# Patient Record
Sex: MALE | Race: WHITE | NOT HISPANIC OR LATINO | Employment: FULL TIME | URBAN - METROPOLITAN AREA
[De-identification: names, ages, dates, MRNs, and addresses within clinical notes are randomized per-mention and may not be internally consistent; named-entity substitution may affect disease eponyms.]

---

## 2023-06-23 NOTE — PROGRESS NOTES
Health Maintenance Due   Topic     Hepatitis C Screening  Consult pcp    HIV Screening  Consult pcp    TETANUS VACCINE  Pt agree to get today    Colorectal Cancer Screening  Information received.      COVID-19 Vaccine (4 - Pfizer series) Not offered at this Facility

## 2023-06-26 ENCOUNTER — LAB VISIT (OUTPATIENT)
Dept: LAB | Facility: HOSPITAL | Age: 48
End: 2023-06-26
Attending: STUDENT IN AN ORGANIZED HEALTH CARE EDUCATION/TRAINING PROGRAM
Payer: COMMERCIAL

## 2023-06-26 ENCOUNTER — OFFICE VISIT (OUTPATIENT)
Dept: FAMILY MEDICINE | Facility: CLINIC | Age: 48
End: 2023-06-26
Payer: COMMERCIAL

## 2023-06-26 VITALS
HEIGHT: 68 IN | RESPIRATION RATE: 18 BRPM | OXYGEN SATURATION: 96 % | SYSTOLIC BLOOD PRESSURE: 121 MMHG | BODY MASS INDEX: 40.23 KG/M2 | TEMPERATURE: 98 F | WEIGHT: 265.44 LBS | HEART RATE: 66 BPM | DIASTOLIC BLOOD PRESSURE: 82 MMHG

## 2023-06-26 DIAGNOSIS — Z23 NEED FOR TDAP VACCINATION: ICD-10-CM

## 2023-06-26 DIAGNOSIS — I10 PRIMARY HYPERTENSION: Primary | ICD-10-CM

## 2023-06-26 DIAGNOSIS — Z11.59 ENCOUNTER FOR HCV SCREENING TEST FOR LOW RISK PATIENT: ICD-10-CM

## 2023-06-26 DIAGNOSIS — Z11.4 ENCOUNTER FOR SCREENING FOR HIV: ICD-10-CM

## 2023-06-26 DIAGNOSIS — R22.1 LUMP IN NECK: ICD-10-CM

## 2023-06-26 DIAGNOSIS — Z00.00 ANNUAL PHYSICAL EXAM: ICD-10-CM

## 2023-06-26 DIAGNOSIS — R06.83 SNORING: ICD-10-CM

## 2023-06-26 DIAGNOSIS — Z12.11 ENCOUNTER FOR COLORECTAL CANCER SCREENING: ICD-10-CM

## 2023-06-26 DIAGNOSIS — Z12.12 ENCOUNTER FOR COLORECTAL CANCER SCREENING: ICD-10-CM

## 2023-06-26 DIAGNOSIS — E78.5 HYPERLIPIDEMIA, UNSPECIFIED HYPERLIPIDEMIA TYPE: ICD-10-CM

## 2023-06-26 PROBLEM — E78.49 OTHER HYPERLIPIDEMIA: Status: ACTIVE | Noted: 2022-12-23

## 2023-06-26 LAB
ALBUMIN SERPL BCP-MCNC: 4.2 G/DL (ref 3.5–5.2)
ALP SERPL-CCNC: 51 U/L (ref 55–135)
ALT SERPL W/O P-5'-P-CCNC: 34 U/L (ref 10–44)
ANION GAP SERPL CALC-SCNC: 12 MMOL/L (ref 8–16)
AST SERPL-CCNC: 27 U/L (ref 10–40)
BASOPHILS # BLD AUTO: 0.05 K/UL (ref 0–0.2)
BASOPHILS NFR BLD: 0.5 % (ref 0–1.9)
BILIRUB SERPL-MCNC: 1.1 MG/DL (ref 0.1–1)
BUN SERPL-MCNC: 15 MG/DL (ref 6–20)
CALCIUM SERPL-MCNC: 9.5 MG/DL (ref 8.7–10.5)
CHLORIDE SERPL-SCNC: 99 MMOL/L (ref 95–110)
CHOLEST SERPL-MCNC: 162 MG/DL (ref 120–199)
CHOLEST/HDLC SERPL: 3.8 {RATIO} (ref 2–5)
CO2 SERPL-SCNC: 26 MMOL/L (ref 23–29)
CREAT SERPL-MCNC: 0.9 MG/DL (ref 0.5–1.4)
DIFFERENTIAL METHOD: ABNORMAL
EOSINOPHIL # BLD AUTO: 0.2 K/UL (ref 0–0.5)
EOSINOPHIL NFR BLD: 1.8 % (ref 0–8)
ERYTHROCYTE [DISTWIDTH] IN BLOOD BY AUTOMATED COUNT: 14.2 % (ref 11.5–14.5)
EST. GFR  (NO RACE VARIABLE): >60 ML/MIN/1.73 M^2
ESTIMATED AVG GLUCOSE: 111 MG/DL (ref 68–131)
GLUCOSE SERPL-MCNC: 106 MG/DL (ref 70–110)
HBA1C MFR BLD: 5.5 % (ref 4–5.6)
HCT VFR BLD AUTO: 49.1 % (ref 40–54)
HCV AB SERPL QL IA: NORMAL
HDLC SERPL-MCNC: 43 MG/DL (ref 40–75)
HDLC SERPL: 26.5 % (ref 20–50)
HGB BLD-MCNC: 16.1 G/DL (ref 14–18)
HIV 1+2 AB+HIV1 P24 AG SERPL QL IA: NORMAL
IMM GRANULOCYTES # BLD AUTO: 0.04 K/UL (ref 0–0.04)
IMM GRANULOCYTES NFR BLD AUTO: 0.4 % (ref 0–0.5)
LDLC SERPL CALC-MCNC: 99 MG/DL (ref 63–159)
LYMPHOCYTES # BLD AUTO: 2 K/UL (ref 1–4.8)
LYMPHOCYTES NFR BLD: 19.4 % (ref 18–48)
MCH RBC QN AUTO: 29.4 PG (ref 27–31)
MCHC RBC AUTO-ENTMCNC: 32.8 G/DL (ref 32–36)
MCV RBC AUTO: 90 FL (ref 82–98)
MONOCYTES # BLD AUTO: 1.2 K/UL (ref 0.3–1)
MONOCYTES NFR BLD: 11.4 % (ref 4–15)
NEUTROPHILS # BLD AUTO: 7 K/UL (ref 1.8–7.7)
NEUTROPHILS NFR BLD: 66.5 % (ref 38–73)
NONHDLC SERPL-MCNC: 119 MG/DL
NRBC BLD-RTO: 0 /100 WBC
PLATELET # BLD AUTO: 265 K/UL (ref 150–450)
PMV BLD AUTO: 10.6 FL (ref 9.2–12.9)
POTASSIUM SERPL-SCNC: 4.1 MMOL/L (ref 3.5–5.1)
PROT SERPL-MCNC: 7.3 G/DL (ref 6–8.4)
RBC # BLD AUTO: 5.48 M/UL (ref 4.6–6.2)
SODIUM SERPL-SCNC: 137 MMOL/L (ref 136–145)
TRIGL SERPL-MCNC: 100 MG/DL (ref 30–150)
TSH SERPL DL<=0.005 MIU/L-ACNC: 1.96 UIU/ML (ref 0.4–4)
WBC # BLD AUTO: 10.46 K/UL (ref 3.9–12.7)

## 2023-06-26 PROCEDURE — 90471 IMMUNIZATION ADMIN: CPT | Mod: S$GLB,,, | Performed by: STUDENT IN AN ORGANIZED HEALTH CARE EDUCATION/TRAINING PROGRAM

## 2023-06-26 PROCEDURE — 84443 ASSAY THYROID STIM HORMONE: CPT | Performed by: STUDENT IN AN ORGANIZED HEALTH CARE EDUCATION/TRAINING PROGRAM

## 2023-06-26 PROCEDURE — 99999 PR PBB SHADOW E&M-EST. PATIENT-LVL V: ICD-10-PCS | Mod: PBBFAC,,, | Performed by: STUDENT IN AN ORGANIZED HEALTH CARE EDUCATION/TRAINING PROGRAM

## 2023-06-26 PROCEDURE — 3074F PR MOST RECENT SYSTOLIC BLOOD PRESSURE < 130 MM HG: ICD-10-PCS | Mod: CPTII,S$GLB,, | Performed by: STUDENT IN AN ORGANIZED HEALTH CARE EDUCATION/TRAINING PROGRAM

## 2023-06-26 PROCEDURE — 3079F DIAST BP 80-89 MM HG: CPT | Mod: CPTII,S$GLB,, | Performed by: STUDENT IN AN ORGANIZED HEALTH CARE EDUCATION/TRAINING PROGRAM

## 2023-06-26 PROCEDURE — 99999 PR PBB SHADOW E&M-EST. PATIENT-LVL V: CPT | Mod: PBBFAC,,, | Performed by: STUDENT IN AN ORGANIZED HEALTH CARE EDUCATION/TRAINING PROGRAM

## 2023-06-26 PROCEDURE — 3079F PR MOST RECENT DIASTOLIC BLOOD PRESSURE 80-89 MM HG: ICD-10-PCS | Mod: CPTII,S$GLB,, | Performed by: STUDENT IN AN ORGANIZED HEALTH CARE EDUCATION/TRAINING PROGRAM

## 2023-06-26 PROCEDURE — 1159F MED LIST DOCD IN RCRD: CPT | Mod: CPTII,S$GLB,, | Performed by: STUDENT IN AN ORGANIZED HEALTH CARE EDUCATION/TRAINING PROGRAM

## 2023-06-26 PROCEDURE — 83036 HEMOGLOBIN GLYCOSYLATED A1C: CPT | Performed by: STUDENT IN AN ORGANIZED HEALTH CARE EDUCATION/TRAINING PROGRAM

## 2023-06-26 PROCEDURE — 3074F SYST BP LT 130 MM HG: CPT | Mod: CPTII,S$GLB,, | Performed by: STUDENT IN AN ORGANIZED HEALTH CARE EDUCATION/TRAINING PROGRAM

## 2023-06-26 PROCEDURE — 1160F RVW MEDS BY RX/DR IN RCRD: CPT | Mod: CPTII,S$GLB,, | Performed by: STUDENT IN AN ORGANIZED HEALTH CARE EDUCATION/TRAINING PROGRAM

## 2023-06-26 PROCEDURE — 87389 HIV-1 AG W/HIV-1&-2 AB AG IA: CPT | Performed by: STUDENT IN AN ORGANIZED HEALTH CARE EDUCATION/TRAINING PROGRAM

## 2023-06-26 PROCEDURE — 90715 TDAP VACCINE 7 YRS/> IM: CPT | Mod: S$GLB,,, | Performed by: STUDENT IN AN ORGANIZED HEALTH CARE EDUCATION/TRAINING PROGRAM

## 2023-06-26 PROCEDURE — 86803 HEPATITIS C AB TEST: CPT | Performed by: STUDENT IN AN ORGANIZED HEALTH CARE EDUCATION/TRAINING PROGRAM

## 2023-06-26 PROCEDURE — 3008F BODY MASS INDEX DOCD: CPT | Mod: CPTII,S$GLB,, | Performed by: STUDENT IN AN ORGANIZED HEALTH CARE EDUCATION/TRAINING PROGRAM

## 2023-06-26 PROCEDURE — 36415 COLL VENOUS BLD VENIPUNCTURE: CPT | Mod: PO | Performed by: STUDENT IN AN ORGANIZED HEALTH CARE EDUCATION/TRAINING PROGRAM

## 2023-06-26 PROCEDURE — 3008F PR BODY MASS INDEX (BMI) DOCUMENTED: ICD-10-PCS | Mod: CPTII,S$GLB,, | Performed by: STUDENT IN AN ORGANIZED HEALTH CARE EDUCATION/TRAINING PROGRAM

## 2023-06-26 PROCEDURE — 99204 OFFICE O/P NEW MOD 45 MIN: CPT | Mod: 25,S$GLB,, | Performed by: STUDENT IN AN ORGANIZED HEALTH CARE EDUCATION/TRAINING PROGRAM

## 2023-06-26 PROCEDURE — 90715 TDAP VACCINE GREATER THAN OR EQUAL TO 7YO IM: ICD-10-PCS | Mod: S$GLB,,, | Performed by: STUDENT IN AN ORGANIZED HEALTH CARE EDUCATION/TRAINING PROGRAM

## 2023-06-26 PROCEDURE — 80061 LIPID PANEL: CPT | Performed by: STUDENT IN AN ORGANIZED HEALTH CARE EDUCATION/TRAINING PROGRAM

## 2023-06-26 PROCEDURE — 80053 COMPREHEN METABOLIC PANEL: CPT | Performed by: STUDENT IN AN ORGANIZED HEALTH CARE EDUCATION/TRAINING PROGRAM

## 2023-06-26 PROCEDURE — 1159F PR MEDICATION LIST DOCUMENTED IN MEDICAL RECORD: ICD-10-PCS | Mod: CPTII,S$GLB,, | Performed by: STUDENT IN AN ORGANIZED HEALTH CARE EDUCATION/TRAINING PROGRAM

## 2023-06-26 PROCEDURE — 99204 PR OFFICE/OUTPT VISIT, NEW, LEVL IV, 45-59 MIN: ICD-10-PCS | Mod: 25,S$GLB,, | Performed by: STUDENT IN AN ORGANIZED HEALTH CARE EDUCATION/TRAINING PROGRAM

## 2023-06-26 PROCEDURE — 85025 COMPLETE CBC W/AUTO DIFF WBC: CPT | Performed by: STUDENT IN AN ORGANIZED HEALTH CARE EDUCATION/TRAINING PROGRAM

## 2023-06-26 PROCEDURE — 90471 TDAP VACCINE GREATER THAN OR EQUAL TO 7YO IM: ICD-10-PCS | Mod: S$GLB,,, | Performed by: STUDENT IN AN ORGANIZED HEALTH CARE EDUCATION/TRAINING PROGRAM

## 2023-06-26 PROCEDURE — 1160F PR REVIEW ALL MEDS BY PRESCRIBER/CLIN PHARMACIST DOCUMENTED: ICD-10-PCS | Mod: CPTII,S$GLB,, | Performed by: STUDENT IN AN ORGANIZED HEALTH CARE EDUCATION/TRAINING PROGRAM

## 2023-06-26 RX ORDER — ROSUVASTATIN CALCIUM 10 MG/1
10 TABLET, COATED ORAL
COMMUNITY
Start: 2023-03-21 | End: 2023-06-26

## 2023-06-26 RX ORDER — LOSARTAN POTASSIUM AND HYDROCHLOROTHIAZIDE 12.5; 1 MG/1; MG/1
1 TABLET ORAL DAILY
Qty: 90 TABLET | Refills: 3 | Status: SHIPPED | OUTPATIENT
Start: 2023-06-26 | End: 2024-06-25

## 2023-06-26 RX ORDER — LOSARTAN POTASSIUM AND HYDROCHLOROTHIAZIDE 12.5; 1 MG/1; MG/1
1 TABLET ORAL
COMMUNITY
Start: 2023-03-21 | End: 2023-06-26 | Stop reason: SDUPTHER

## 2023-06-26 RX ORDER — ROSUVASTATIN CALCIUM 10 MG/1
TABLET, COATED ORAL
COMMUNITY
End: 2023-06-26 | Stop reason: SDUPTHER

## 2023-06-26 RX ORDER — ROSUVASTATIN CALCIUM 10 MG/1
10 TABLET, COATED ORAL DAILY
Qty: 90 TABLET | Refills: 3 | Status: SHIPPED | OUTPATIENT
Start: 2023-06-26 | End: 2024-06-25

## 2023-06-26 NOTE — PROGRESS NOTES
SUBJECTIVE     Chief Complaint   Patient presents with    Medication Refill    Establish Care       HPI  Pepe Miguel is a 47 y.o. male with  HTN, HLD  that presents for establishment of care.    Pt reports that he is overall feeling well without complaint. Pt reports that he has a good appetite and eats a varied diet including fruit and vegetables. He does not engage in cardiovascular exercise. He is not sleeping well at night. Pt does not take any OTC supplements. He is not under stress recently and mood has been good. He is sexually active and does not request sexually transmitted disease testing. Pt is not UTD on age appropriate CA screening.     Pt reports posterior neck lump which has been there for a few years, previously imaged and benign per pt.     Pt former smoker, 2 packs/wk 15 years.   Works as , .     PAST MEDICAL HISTORY:  Past Medical History:   Diagnosis Date    Essential (primary) hypertension     Insomnia, unspecified     Mixed hyperlipidemia     Personal history of nicotine dependence     Vitamin D deficiency, unspecified        ALLERGIES AND MEDICATIONS: updated and reviewed.  Review of patient's allergies indicates:   Allergen Reactions    Sulfa (sulfonamide antibiotics) Hives     Current Outpatient Medications   Medication Sig Dispense Refill    losartan-hydrochlorothiazide 100-12.5 mg (HYZAAR) 100-12.5 mg Tab Take 1 tablet by mouth once daily. 90 tablet 3    rosuvastatin (CRESTOR) 10 MG tablet Take 1 tablet (10 mg total) by mouth once daily. 90 tablet 3     No current facility-administered medications for this visit.       ROS  Review of Systems   Constitutional:  Negative for chills, fatigue and fever.   HENT:  Negative for rhinorrhea and sore throat.    Respiratory:  Negative for cough and shortness of breath.    Cardiovascular:  Negative for chest pain and palpitations.   Gastrointestinal:  Negative for constipation, diarrhea, nausea and vomiting.  "  Genitourinary:  Negative for dysuria.   Musculoskeletal:  Negative for joint swelling.   Skin:  Negative for rash and wound.   Neurological:  Negative for light-headedness and headaches.   Psychiatric/Behavioral:  Negative for dysphoric mood and sleep disturbance. The patient is not nervous/anxious.        OBJECTIVE     Physical Exam  Vitals:    06/26/23 0918   BP: 121/82   Pulse:    Resp:    Temp:     Body mass index is 40.36 kg/m².  Weight: 120.4 kg (265 lb 6.9 oz)   Height: 5' 8" (172.7 cm)     Physical Exam  Vitals reviewed.   Constitutional:       General: He is not in acute distress.  HENT:      Right Ear: External ear normal.      Left Ear: External ear normal.      Nose: Nose normal.      Mouth/Throat:      Mouth: Mucous membranes are moist.   Eyes:      Extraocular Movements: Extraocular movements intact.      Conjunctiva/sclera: Conjunctivae normal.      Pupils: Pupils are equal, round, and reactive to light.   Pulmonary:      Effort: Pulmonary effort is normal.   Abdominal:      General: There is no distension.      Palpations: Abdomen is soft.   Musculoskeletal:         General: No swelling. Normal range of motion.      Cervical back: Normal range of motion.   Skin:     General: Skin is warm and dry.      Findings: No rash.   Neurological:      General: No focal deficit present.      Mental Status: He is alert and oriented to person, place, and time.   Psychiatric:         Mood and Affect: Mood normal.         Behavior: Behavior normal.         Health Maintenance         Date Due Completion Date    Hepatitis C Screening Never done ---    HIV Screening Never done ---    Hemoglobin A1c (Diabetic Prevention Screening) Never done ---    Colorectal Cancer Screening Never done ---    Influenza Vaccine (Season Ended) 09/01/2023 ---    Lipid Panel 02/26/2026 2/26/2021    Override on 3/11/2019: Done    TETANUS VACCINE 06/26/2033 6/26/2023              ASSESSMENT     47 y.o. male with     1. Primary hypertension "    2. Hyperlipidemia, unspecified hyperlipidemia type    3. Encounter for colorectal cancer screening    4. Need for Tdap vaccination    5. Annual physical exam    6. Encounter for screening for HIV    7. Encounter for HCV screening test for low risk patient    8. Snoring    9. Lump in neck        PLAN:     1. Primary hypertension  - Stable Patient was counseled and encouraged to maintain a low sodium diet, as well as increasing physical activity.  Recommend random BP checks at home on a regular basis. Repeat BP at end of visit was improved. Will continue medication at this time, and follow up in 3-6 months, or sooner if blood pressure begins to increase.     - losartan-hydrochlorothiazide 100-12.5 mg (HYZAAR) 100-12.5 mg Tab; Take 1 tablet by mouth once daily.  Dispense: 90 tablet; Refill: 3    2. Hyperlipidemia, unspecified hyperlipidemia type  - Stable, continue current regimen. Due for laboratory monitoring, ordered. Follow up 6 months.  - rosuvastatin (CRESTOR) 10 MG tablet; Take 1 tablet (10 mg total) by mouth once daily.  Dispense: 90 tablet; Refill: 3    3. Encounter for colorectal cancer screening  - Due, ordered.  - Ambulatory referral/consult to Endo Procedure ; Future    4. Need for Tdap vaccination  - Due, ordered.  - Tdap Vaccine    5. Annual physical exam  - Discussed age and gender appropriate screenings at this visit and encouraged a healthy diet low in simple carbohydrates, and increased physical activity.  Counseled on medically appropriate vaccines based on age and current health condition.  Screening test reviewed and discussed with patient.   - Hemoglobin A1C; Future  - Lipid Panel; Future  - TSH; Future  - Comprehensive Metabolic Panel; Future  - CBC Auto Differential; Future    6. Encounter for screening for HIV  - Due, ordered.  - HIV 1/2 Ag/Ab (4th Gen); Future    7. Encounter for HCV screening test for low risk patient  - Due, ordered.  - Hepatitis C Antibody; Future    8.  Snoring  - Noted, pt w/ HTN and sleep problems. Refer to sleep med.  - Ambulatory referral/consult to Sleep Disorders; Future    9. Lump in neck  - Stable, but pt would like removed. Refer to gen surg.  - Ambulatory referral/consult to General Surgery; Future        Mary Dueñas MD  06/26/2023 8:54 AM        Follow up in about 6 months (around 12/26/2023).

## 2023-07-27 ENCOUNTER — TELEPHONE (OUTPATIENT)
Dept: SURGERY | Facility: CLINIC | Age: 48
End: 2023-07-27
Payer: COMMERCIAL

## 2023-07-31 ENCOUNTER — OFFICE VISIT (OUTPATIENT)
Dept: SURGERY | Facility: CLINIC | Age: 48
End: 2023-07-31
Payer: COMMERCIAL

## 2023-07-31 ENCOUNTER — ANESTHESIA EVENT (OUTPATIENT)
Dept: SURGERY | Facility: HOSPITAL | Age: 48
End: 2023-07-31
Payer: COMMERCIAL

## 2023-07-31 VITALS
DIASTOLIC BLOOD PRESSURE: 85 MMHG | HEIGHT: 68 IN | SYSTOLIC BLOOD PRESSURE: 130 MMHG | BODY MASS INDEX: 40.53 KG/M2 | HEART RATE: 92 BPM | WEIGHT: 267.44 LBS | OXYGEN SATURATION: 96 %

## 2023-07-31 DIAGNOSIS — R22.2 MASS OF SUBCUTANEOUS TISSUE OF BACK: Primary | ICD-10-CM

## 2023-07-31 PROCEDURE — 99999 PR PBB SHADOW E&M-EST. PATIENT-LVL IV: CPT | Mod: PBBFAC,,, | Performed by: SURGERY

## 2023-07-31 PROCEDURE — 3008F BODY MASS INDEX DOCD: CPT | Mod: CPTII,S$GLB,, | Performed by: SURGERY

## 2023-07-31 PROCEDURE — 3044F HG A1C LEVEL LT 7.0%: CPT | Mod: CPTII,S$GLB,, | Performed by: SURGERY

## 2023-07-31 PROCEDURE — 1159F PR MEDICATION LIST DOCUMENTED IN MEDICAL RECORD: ICD-10-PCS | Mod: CPTII,S$GLB,, | Performed by: SURGERY

## 2023-07-31 PROCEDURE — 3075F SYST BP GE 130 - 139MM HG: CPT | Mod: CPTII,S$GLB,, | Performed by: SURGERY

## 2023-07-31 PROCEDURE — 3008F PR BODY MASS INDEX (BMI) DOCUMENTED: ICD-10-PCS | Mod: CPTII,S$GLB,, | Performed by: SURGERY

## 2023-07-31 PROCEDURE — 3079F PR MOST RECENT DIASTOLIC BLOOD PRESSURE 80-89 MM HG: ICD-10-PCS | Mod: CPTII,S$GLB,, | Performed by: SURGERY

## 2023-07-31 PROCEDURE — 3044F PR MOST RECENT HEMOGLOBIN A1C LEVEL <7.0%: ICD-10-PCS | Mod: CPTII,S$GLB,, | Performed by: SURGERY

## 2023-07-31 PROCEDURE — 3075F PR MOST RECENT SYSTOLIC BLOOD PRESS GE 130-139MM HG: ICD-10-PCS | Mod: CPTII,S$GLB,, | Performed by: SURGERY

## 2023-07-31 PROCEDURE — 99999 PR PBB SHADOW E&M-EST. PATIENT-LVL IV: ICD-10-PCS | Mod: PBBFAC,,, | Performed by: SURGERY

## 2023-07-31 PROCEDURE — 99203 PR OFFICE/OUTPT VISIT, NEW, LEVL III, 30-44 MIN: ICD-10-PCS | Mod: S$GLB,,, | Performed by: SURGERY

## 2023-07-31 PROCEDURE — 3079F DIAST BP 80-89 MM HG: CPT | Mod: CPTII,S$GLB,, | Performed by: SURGERY

## 2023-07-31 PROCEDURE — 99203 OFFICE O/P NEW LOW 30 MIN: CPT | Mod: S$GLB,,, | Performed by: SURGERY

## 2023-07-31 PROCEDURE — 1159F MED LIST DOCD IN RCRD: CPT | Mod: CPTII,S$GLB,, | Performed by: SURGERY

## 2023-07-31 RX ORDER — SODIUM CHLORIDE 9 MG/ML
INJECTION, SOLUTION INTRAVENOUS CONTINUOUS
Status: CANCELLED | OUTPATIENT
Start: 2023-07-31

## 2023-07-31 NOTE — H&P
History & Physical    SUBJECTIVE:     History of Present Illness:  Patient is a 48 y.o. male presents with right upper back/posterior neck soft tissue mass. Present for 10+ years. Starting to become more uncomfortable with turning his head. Had an ultrasound years ago, no significant findings. Interested in removal in OR  No f/c/n/v/sob/cp.    Chief Complaint   Patient presents with    lipoma     Lipoma of right neck.       Review of patient's allergies indicates:   Allergen Reactions    Sulfa (sulfonamide antibiotics) Hives       Current Outpatient Medications   Medication Sig Dispense Refill    losartan-hydrochlorothiazide 100-12.5 mg (HYZAAR) 100-12.5 mg Tab Take 1 tablet by mouth once daily. 90 tablet 3    rosuvastatin (CRESTOR) 10 MG tablet Take 1 tablet (10 mg total) by mouth once daily. 90 tablet 3     No current facility-administered medications for this visit.       Past Medical History:   Diagnosis Date    Essential (primary) hypertension     Insomnia, unspecified     Mixed hyperlipidemia     Personal history of nicotine dependence     Vitamin D deficiency, unspecified      Past Surgical History:   Procedure Laterality Date    pinky finger Right      No family history on file.  Social History     Tobacco Use    Smoking status: Former     Current packs/day: 0.00     Types: Cigarettes     Start date:      Quit date: 2018     Years since quittin.5    Smokeless tobacco: Never        Review of Systems:  Review of Systems   Constitutional:  Negative for chills and fever.   HENT: Negative.     Eyes: Negative.    Respiratory:  Negative for cough, chest tightness and shortness of breath.    Cardiovascular: Negative.    Gastrointestinal:  Negative for abdominal pain, blood in stool, constipation, diarrhea, nausea and vomiting.   Endocrine: Negative for cold intolerance and heat intolerance.   Genitourinary: Negative.    Musculoskeletal:  Positive for neck pain.   Skin: Negative.  Negative for rash.  "  Neurological:  Negative for dizziness, syncope and light-headedness.   Psychiatric/Behavioral:  Negative for agitation, confusion and hallucinations.        OBJECTIVE:     Vital Signs (Most Recent)  Pulse: 92 (07/31/23 1128)  BP: 130/85 (07/31/23 1128)  SpO2: 96 % (07/31/23 1128)  5' 8" (1.727 m)  121.3 kg (267 lb 6.7 oz)     Physical Exam:  Physical Exam  Constitutional:       General: He is not in acute distress.     Appearance: He is well-developed. He is not diaphoretic.   HENT:      Head: Normocephalic and atraumatic.   Eyes:      Conjunctiva/sclera: Conjunctivae normal.      Pupils: Pupils are equal, round, and reactive to light.   Neck:        Comments: 6x4x3 cm mobile soft tissue mass w no skin changes  Cardiovascular:      Rate and Rhythm: Normal rate and regular rhythm.      Pulses: Normal pulses.      Heart sounds: Normal heart sounds.   Pulmonary:      Effort: Pulmonary effort is normal. No respiratory distress.      Breath sounds: Normal breath sounds. No stridor. No wheezing.   Abdominal:      General: Bowel sounds are normal. There is no distension.      Palpations: Abdomen is soft.      Tenderness: There is no abdominal tenderness.   Musculoskeletal:         General: Normal range of motion.      Cervical back: Normal range of motion and neck supple.   Skin:     General: Skin is warm and dry.      Findings: No rash.   Neurological:      Mental Status: He is alert and oriented to person, place, and time.      Cranial Nerves: No cranial nerve deficit.   Psychiatric:         Behavior: Behavior normal.       ASSESSMENT/PLAN:     48 yr old obese WM w soft tissue mass of right upper back/lateral posterior neck    PLAN:Plan     To OR 8/23/23 for excision of soft tissue mass, right upper back  Risks and side effects discussed with the patient. Alterative therapies discussed. Patient agreeable to procedure and has signed consent which was discussed in detail.           "

## 2023-08-21 ENCOUNTER — HOSPITAL ENCOUNTER (OUTPATIENT)
Dept: PREADMISSION TESTING | Facility: HOSPITAL | Age: 48
Discharge: HOME | End: 2023-08-21
Attending: SURGERY
Payer: COMMERCIAL

## 2023-08-21 ENCOUNTER — HOSPITAL ENCOUNTER (OUTPATIENT)
Dept: RADIOLOGY | Facility: HOSPITAL | Age: 48
Discharge: HOME | End: 2023-08-21
Attending: SURGERY
Payer: COMMERCIAL

## 2023-08-21 VITALS
BODY MASS INDEX: 40.89 KG/M2 | WEIGHT: 269.81 LBS | HEIGHT: 68 IN | OXYGEN SATURATION: 98 % | RESPIRATION RATE: 20 BRPM | SYSTOLIC BLOOD PRESSURE: 134 MMHG | DIASTOLIC BLOOD PRESSURE: 88 MMHG | TEMPERATURE: 99 F | HEART RATE: 76 BPM

## 2023-08-21 DIAGNOSIS — R22.2 MASS OF SUBCUTANEOUS TISSUE OF BACK: ICD-10-CM

## 2023-08-21 DIAGNOSIS — Z01.818 PREOP TESTING: Primary | ICD-10-CM

## 2023-08-21 LAB
ANION GAP SERPL CALC-SCNC: 8 MMOL/L (ref 8–16)
BASOPHILS # BLD AUTO: 0.07 K/UL (ref 0–0.2)
BASOPHILS NFR BLD: 0.7 % (ref 0–1.9)
BUN SERPL-MCNC: 18 MG/DL (ref 6–20)
CALCIUM SERPL-MCNC: 9.5 MG/DL (ref 8.7–10.5)
CHLORIDE SERPL-SCNC: 105 MMOL/L (ref 95–110)
CO2 SERPL-SCNC: 26 MMOL/L (ref 23–29)
CREAT SERPL-MCNC: 0.8 MG/DL (ref 0.5–1.4)
DIFFERENTIAL METHOD: ABNORMAL
EOSINOPHIL # BLD AUTO: 0.2 K/UL (ref 0–0.5)
EOSINOPHIL NFR BLD: 2 % (ref 0–8)
ERYTHROCYTE [DISTWIDTH] IN BLOOD BY AUTOMATED COUNT: 14.4 % (ref 11.5–14.5)
EST. GFR  (NO RACE VARIABLE): >60 ML/MIN/1.73 M^2
GLUCOSE SERPL-MCNC: 104 MG/DL (ref 70–110)
HCT VFR BLD AUTO: 45.3 % (ref 40–54)
HGB BLD-MCNC: 14.9 G/DL (ref 14–18)
IMM GRANULOCYTES # BLD AUTO: 0.06 K/UL (ref 0–0.04)
IMM GRANULOCYTES NFR BLD AUTO: 0.6 % (ref 0–0.5)
LYMPHOCYTES # BLD AUTO: 1.8 K/UL (ref 1–4.8)
LYMPHOCYTES NFR BLD: 16.7 % (ref 18–48)
MCH RBC QN AUTO: 29.7 PG (ref 27–31)
MCHC RBC AUTO-ENTMCNC: 32.9 G/DL (ref 32–36)
MCV RBC AUTO: 90 FL (ref 82–98)
MONOCYTES # BLD AUTO: 1.1 K/UL (ref 0.3–1)
MONOCYTES NFR BLD: 10.8 % (ref 4–15)
NEUTROPHILS # BLD AUTO: 7.3 K/UL (ref 1.8–7.7)
NEUTROPHILS NFR BLD: 69.2 % (ref 38–73)
NRBC BLD-RTO: 0 /100 WBC
PLATELET # BLD AUTO: 250 K/UL (ref 150–450)
PMV BLD AUTO: 10.3 FL (ref 9.2–12.9)
POTASSIUM SERPL-SCNC: 4.1 MMOL/L (ref 3.5–5.1)
RBC # BLD AUTO: 5.02 M/UL (ref 4.6–6.2)
SODIUM SERPL-SCNC: 139 MMOL/L (ref 136–145)
WBC # BLD AUTO: 10.51 K/UL (ref 3.9–12.7)

## 2023-08-21 PROCEDURE — 93010 EKG 12-LEAD: ICD-10-PCS | Mod: ,,, | Performed by: INTERNAL MEDICINE

## 2023-08-21 PROCEDURE — 71045 XR CHEST 1 VIEW PRE-OP: ICD-10-PCS | Mod: 26,,, | Performed by: STUDENT IN AN ORGANIZED HEALTH CARE EDUCATION/TRAINING PROGRAM

## 2023-08-21 PROCEDURE — 80048 BASIC METABOLIC PNL TOTAL CA: CPT | Performed by: SURGERY

## 2023-08-21 PROCEDURE — 71045 X-RAY EXAM CHEST 1 VIEW: CPT | Mod: 26,,, | Performed by: STUDENT IN AN ORGANIZED HEALTH CARE EDUCATION/TRAINING PROGRAM

## 2023-08-21 PROCEDURE — 85025 COMPLETE CBC W/AUTO DIFF WBC: CPT | Performed by: SURGERY

## 2023-08-21 PROCEDURE — 71045 X-RAY EXAM CHEST 1 VIEW: CPT | Mod: TC,FY

## 2023-08-21 PROCEDURE — 93010 ELECTROCARDIOGRAM REPORT: CPT | Mod: ,,, | Performed by: INTERNAL MEDICINE

## 2023-08-21 PROCEDURE — 93005 ELECTROCARDIOGRAM TRACING: CPT

## 2023-08-21 RX ORDER — CHOLECALCIFEROL (VITAMIN D3) 25 MCG
2000 TABLET ORAL DAILY
COMMUNITY

## 2023-08-21 NOTE — DISCHARGE INSTRUCTIONS

## 2023-08-23 ENCOUNTER — ANESTHESIA (OUTPATIENT)
Dept: SURGERY | Facility: HOSPITAL | Age: 48
End: 2023-08-23
Payer: COMMERCIAL

## 2023-08-23 ENCOUNTER — HOSPITAL ENCOUNTER (OUTPATIENT)
Facility: HOSPITAL | Age: 48
Discharge: HOME | End: 2023-08-23
Attending: SURGERY | Admitting: SURGERY
Payer: COMMERCIAL

## 2023-08-23 VITALS
RESPIRATION RATE: 12 BRPM | OXYGEN SATURATION: 96 % | DIASTOLIC BLOOD PRESSURE: 73 MMHG | BODY MASS INDEX: 41.02 KG/M2 | SYSTOLIC BLOOD PRESSURE: 113 MMHG | HEART RATE: 82 BPM | WEIGHT: 269.81 LBS | TEMPERATURE: 98 F

## 2023-08-23 DIAGNOSIS — R22.2 MASS OF SUBCUTANEOUS TISSUE OF BACK: Primary | ICD-10-CM

## 2023-08-23 PROCEDURE — 37000009 HC ANESTHESIA EA ADD 15 MINS: Performed by: SURGERY

## 2023-08-23 PROCEDURE — D9220A PRA ANESTHESIA: ICD-10-PCS | Mod: CRNA,,, | Performed by: STUDENT IN AN ORGANIZED HEALTH CARE EDUCATION/TRAINING PROGRAM

## 2023-08-23 PROCEDURE — 63600175 PHARM REV CODE 636 W HCPCS: Performed by: STUDENT IN AN ORGANIZED HEALTH CARE EDUCATION/TRAINING PROGRAM

## 2023-08-23 PROCEDURE — 71000016 HC POSTOP RECOV ADDL HR: Performed by: SURGERY

## 2023-08-23 PROCEDURE — D9220A PRA ANESTHESIA: ICD-10-PCS | Mod: ANES,,, | Performed by: ANESTHESIOLOGY

## 2023-08-23 PROCEDURE — 25000003 PHARM REV CODE 250: Performed by: STUDENT IN AN ORGANIZED HEALTH CARE EDUCATION/TRAINING PROGRAM

## 2023-08-23 PROCEDURE — 71000015 HC POSTOP RECOV 1ST HR: Performed by: SURGERY

## 2023-08-23 PROCEDURE — 21931 PR EXCISION TUMOR SOFT TISSUE BACK/FLANK SUBQ 3+CM: ICD-10-PCS | Mod: ,,, | Performed by: SURGERY

## 2023-08-23 PROCEDURE — 21931 EXC BACK LES SC 3 CM/>: CPT | Mod: ,,, | Performed by: SURGERY

## 2023-08-23 PROCEDURE — D9220A PRA ANESTHESIA: Mod: CRNA,,, | Performed by: STUDENT IN AN ORGANIZED HEALTH CARE EDUCATION/TRAINING PROGRAM

## 2023-08-23 PROCEDURE — 36000707: Performed by: SURGERY

## 2023-08-23 PROCEDURE — D9220A PRA ANESTHESIA: Mod: ANES,,, | Performed by: ANESTHESIOLOGY

## 2023-08-23 PROCEDURE — 25000003 PHARM REV CODE 250: Performed by: SURGERY

## 2023-08-23 PROCEDURE — 37000008 HC ANESTHESIA 1ST 15 MINUTES: Performed by: SURGERY

## 2023-08-23 PROCEDURE — 88307 PR  SURG PATH,LEVEL V: ICD-10-PCS | Mod: 26,,, | Performed by: PATHOLOGY

## 2023-08-23 PROCEDURE — 63600175 PHARM REV CODE 636 W HCPCS: Performed by: SURGERY

## 2023-08-23 PROCEDURE — 36000706: Performed by: SURGERY

## 2023-08-23 PROCEDURE — 88307 TISSUE EXAM BY PATHOLOGIST: CPT | Mod: 26,,, | Performed by: PATHOLOGY

## 2023-08-23 PROCEDURE — 88307 TISSUE EXAM BY PATHOLOGIST: CPT | Performed by: PATHOLOGY

## 2023-08-23 RX ORDER — PROPOFOL 10 MG/ML
VIAL (ML) INTRAVENOUS
Status: DISCONTINUED | OUTPATIENT
Start: 2023-08-23 | End: 2023-08-23

## 2023-08-23 RX ORDER — LIDOCAINE HYDROCHLORIDE 20 MG/ML
INJECTION INTRAVENOUS
Status: DISCONTINUED | OUTPATIENT
Start: 2023-08-23 | End: 2023-08-23

## 2023-08-23 RX ORDER — SODIUM CHLORIDE 0.9 % (FLUSH) 0.9 %
10 SYRINGE (ML) INJECTION
Status: CANCELLED | OUTPATIENT
Start: 2023-08-23

## 2023-08-23 RX ORDER — FENTANYL CITRATE 50 UG/ML
INJECTION, SOLUTION INTRAMUSCULAR; INTRAVENOUS
Status: DISCONTINUED | OUTPATIENT
Start: 2023-08-23 | End: 2023-08-23

## 2023-08-23 RX ORDER — FENTANYL CITRATE 50 UG/ML
25 INJECTION, SOLUTION INTRAMUSCULAR; INTRAVENOUS EVERY 5 MIN PRN
Status: CANCELLED | OUTPATIENT
Start: 2023-08-23

## 2023-08-23 RX ORDER — MIDAZOLAM HYDROCHLORIDE 1 MG/ML
INJECTION INTRAMUSCULAR; INTRAVENOUS
Status: DISCONTINUED | OUTPATIENT
Start: 2023-08-23 | End: 2023-08-23

## 2023-08-23 RX ORDER — BUPIVACAINE HYDROCHLORIDE 2.5 MG/ML
INJECTION, SOLUTION INFILTRATION; PERINEURAL
Status: DISCONTINUED | OUTPATIENT
Start: 2023-08-23 | End: 2023-08-23 | Stop reason: HOSPADM

## 2023-08-23 RX ORDER — HYDROMORPHONE HYDROCHLORIDE 2 MG/ML
0.5 INJECTION, SOLUTION INTRAMUSCULAR; INTRAVENOUS; SUBCUTANEOUS
Status: CANCELLED | OUTPATIENT
Start: 2023-08-23

## 2023-08-23 RX ORDER — LIDOCAINE HYDROCHLORIDE 10 MG/ML
1 INJECTION, SOLUTION EPIDURAL; INFILTRATION; INTRACAUDAL; PERINEURAL ONCE
Status: DISCONTINUED | OUTPATIENT
Start: 2023-08-23 | End: 2023-08-23 | Stop reason: HOSPADM

## 2023-08-23 RX ORDER — ONDANSETRON 2 MG/ML
INJECTION INTRAMUSCULAR; INTRAVENOUS
Status: DISCONTINUED | OUTPATIENT
Start: 2023-08-23 | End: 2023-08-23

## 2023-08-23 RX ORDER — DIPHENHYDRAMINE HYDROCHLORIDE 50 MG/ML
25 INJECTION INTRAMUSCULAR; INTRAVENOUS EVERY 6 HOURS PRN
Status: CANCELLED | OUTPATIENT
Start: 2023-08-23

## 2023-08-23 RX ORDER — KETOROLAC TROMETHAMINE 10 MG/1
10 TABLET, FILM COATED ORAL EVERY 6 HOURS
Qty: 20 TABLET | Refills: 0 | Status: SHIPPED | OUTPATIENT
Start: 2023-08-23 | End: 2023-08-28

## 2023-08-23 RX ORDER — HYDROCODONE BITARTRATE AND ACETAMINOPHEN 5; 325 MG/1; MG/1
1 TABLET ORAL EVERY 6 HOURS PRN
Qty: 15 TABLET | Refills: 0 | Status: SHIPPED | OUTPATIENT
Start: 2023-08-23

## 2023-08-23 RX ORDER — SODIUM CHLORIDE 9 MG/ML
INJECTION, SOLUTION INTRAVENOUS CONTINUOUS
Status: DISCONTINUED | OUTPATIENT
Start: 2023-08-23 | End: 2023-08-23 | Stop reason: HOSPADM

## 2023-08-23 RX ORDER — HALOPERIDOL 5 MG/ML
0.5 INJECTION INTRAMUSCULAR EVERY 10 MIN PRN
Status: CANCELLED | OUTPATIENT
Start: 2023-08-23

## 2023-08-23 RX ADMIN — PROPOFOL 100 MCG/KG/MIN: 10 INJECTION, EMULSION INTRAVENOUS at 10:08

## 2023-08-23 RX ADMIN — PROPOFOL 30 MG: 10 INJECTION, EMULSION INTRAVENOUS at 10:08

## 2023-08-23 RX ADMIN — PROPOFOL 50 MG: 10 INJECTION, EMULSION INTRAVENOUS at 10:08

## 2023-08-23 RX ADMIN — FENTANYL CITRATE 100 MCG: 50 INJECTION, SOLUTION INTRAMUSCULAR; INTRAVENOUS at 10:08

## 2023-08-23 RX ADMIN — SODIUM CHLORIDE, SODIUM LACTATE, POTASSIUM CHLORIDE, AND CALCIUM CHLORIDE: .6; .31; .03; .02 INJECTION, SOLUTION INTRAVENOUS at 10:08

## 2023-08-23 RX ADMIN — ONDANSETRON 4 MG: 2 INJECTION, SOLUTION INTRAMUSCULAR; INTRAVENOUS at 10:08

## 2023-08-23 RX ADMIN — LIDOCAINE HYDROCHLORIDE 100 MG: 20 INJECTION, SOLUTION INTRAVENOUS at 10:08

## 2023-08-23 RX ADMIN — PROPOFOL 75 MCG/KG/MIN: 10 INJECTION, EMULSION INTRAVENOUS at 10:08

## 2023-08-23 RX ADMIN — MIDAZOLAM HYDROCHLORIDE 2 MG: 1 INJECTION INTRAMUSCULAR; INTRAVENOUS at 10:08

## 2023-08-23 RX ADMIN — CEFAZOLIN SODIUM 100 ML: 2 SOLUTION INTRAVENOUS at 10:08

## 2023-08-23 RX ADMIN — SODIUM CHLORIDE: 9 INJECTION, SOLUTION INTRAVENOUS at 09:08

## 2023-08-23 RX ADMIN — PROPOFOL 30 MG: 10 INJECTION, EMULSION INTRAVENOUS at 11:08

## 2023-08-23 NOTE — ANESTHESIA PREPROCEDURE EVALUATION
[facility]  Anesthesia Pre-Operative Evaluation        Patient Name: Pepe Miguel  YOB: 1975  MRN: [id number]    SUBJECTIVE:     Pre-operative Evaluation for Procedure(s) (LRB):  EXCISION, MASS, BACK (Right)     08/23/2023    Pepe Migeul is a 48 y.o. male with a PMHx significant for HTN, HLD, and BMI 41 with a soft tissue mass of the upper right back/neck region for which he has elected surgical removal.     He now presents for the above procedure(s) with General Surgery - Dr. Carlson    Previous Airway: None documented.    Patient Active Problem List   Diagnosis    Other hyperlipidemia    Primary hypertension       Review of patient's allergies indicates:   Allergen Reactions    Sulfa (sulfonamide antibiotics) Hives       Current Outpatient Medications   Medication Instructions    losartan-hydrochlorothiazide 100-12.5 mg (HYZAAR) 100-12.5 mg Tab 1 tablet, Oral, Daily    rosuvastatin (CRESTOR) 10 mg, Oral, Daily    vitamin D (VITAMIN D3) 2,000 Units, Oral, Daily       Past Surgical History:   Procedure Laterality Date    FRACTURE SURGERY      pinky finger Right 2005       Social History     Substance and Sexual Activity   Drug Use Never     Alcohol Use: Not on file     Tobacco Use: Medium Risk (8/23/2023)    Patient History     Smoking Tobacco Use: Former     Smokeless Tobacco Use: Never     Passive Exposure: Not on file       OBJECTIVE:     Vital Signs Range (Last 24H):  Temp:  [36.8 °C (98.2 °F)]   Pulse:  [68]   Resp:  [18]   BP: (128)/(77)   SpO2:  [95 %]       Significant Labs    Heme Profile  Lab Results   Component Value Date    WBC 10.51 08/21/2023    HGB 14.9 08/21/2023    HCT 45.3 08/21/2023     08/21/2023       BMP  Lab Results   Component Value Date     08/21/2023    K 4.1 08/21/2023     08/21/2023    CO2 26 08/21/2023    BUN 18 08/21/2023    CREATININE 0.8 08/21/2023       Liver Function Tests  Lab Results   Component  Value Date    AST 27 06/26/2023    ALT 34 06/26/2023    ALKPHOS 51 (L) 06/26/2023    BILITOT 1.1 (H) 06/26/2023    PROT 7.3 06/26/2023    ALBUMIN 4.2 06/26/2023       Lipid Profile  Lab Results   Component Value Date    CHOL 162 06/26/2023    HDL 43 06/26/2023    TRIG 100 06/26/2023       Endocrine Profile  Lab Results   Component Value Date    HGBA1C 5.5 06/26/2023    TSH 1.955 06/26/2023       Diagnostic Studies    XR Chest (08/21/2023):  The lungs are clear, with normal appearance of pulmonary vasculature and no pleural effusion or pneumothorax.     The cardiac silhouette is normal in size. The hilar and mediastinal contours are unremarkable.     Bones are intact.    Cardiac Studies    EKG:   Results for orders placed or performed during the hospital encounter of 08/21/23   EKG 12-lead    Collection Time: 08/21/23  9:38 AM    Narrative    Test Reason : Z01.818,    Vent. Rate : 063 BPM     Atrial Rate : 063 BPM     P-R Int : 136 ms          QRS Dur : 100 ms      QT Int : 376 ms       P-R-T Axes : 016 027 022 degrees     QTc Int : 384 ms    Normal sinus rhythm  Normal ECG  No previous ECGs available  Confirmed by Michela MILAN, Carolyne JOHN ([id number]) on 8/21/2023 9:32:15 PM    Referred By:             Confirmed By:Carolyne Abernathy MD             ASSESSMENT/PLAN:     Pepe Miguel is a 48 y.o. male with a right upper back soft tissue mass presenting for excision.      Pre-op Assessment    I have reviewed the Patient Summary Reports.     I have reviewed the Nursing Notes. I have reviewed the NPO Status.   I have reviewed the Medications.     Review of Systems  Anesthesia Hx:  No problems with previous Anesthesia   Denies Personal Hx of Anesthesia complications.   Social:  Former Smoker    Hematology/Oncology:         -- Denies Anemia: Denies Current/Recent Cancer   Cardiovascular:   Exercise tolerance: good Hypertension, well controlled Denies Dysrhythmias.   Denies Angina. hyperlipidemia ECG has been reviewed.     Pulmonary:   Denies COPD.  Denies Asthma.  Denies Sleep Apnea.    Renal/:   Denies Chronic Renal Disease.     Hepatic/GI:   Denies GERD. Denies Liver Disease.    Musculoskeletal:   Denies Arthritis.     Neurological:   Denies CVA. Denies Seizures.    Endocrine:   Denies Diabetes.  Morbid Obesity / BMI > 40  Dermatological:   Soft tissue mass right upper back/neck   Psych:   Denies Psychiatric History.          Physical Exam  General: Well nourished, Cooperative and Alert    Airway:  Mallampati: III / I  Mouth Opening: Normal  TM Distance: Normal  Tongue: Large  Neck ROM: Normal ROM    Dental:  Intact, Periodontal disease        Anesthesia Plan  Type of Anesthesia, risks & benefits discussed:    Anesthesia Type: Gen Natural Airway, MAC  Intra-op Monitoring Plan: Standard ASA Monitors  Post Op Pain Control Plan: multimodal analgesia and IV/PO Opioids PRN  Induction:  IV  Informed Consent: Informed consent signed with the Patient and all parties understand the risks and agree with anesthesia plan.  All questions answered. Patient consented to blood products? Yes  ASA Score: 3  Day of Surgery Review of History & Physical: H&P Update referred to the surgeon/provider.    Ready For Surgery From Anesthesia Perspective.     .

## 2023-08-23 NOTE — TRANSFER OF CARE
Anesthesia Transfer of Care Note    Patient: Pepe Miguel    Procedure(s) Performed: Procedure(s) (LRB):  EXCISION, MASS, BACK (Right)    Patient location: [facility]    Anesthesia Type: MAC    Transport from OR: Transported from OR on room air with adequate spontaneous ventilation    Post pain: adequate analgesia    Post assessment: no apparent anesthetic complications and tolerated procedure well    Post vital signs: stable    Level of consciousness: awake and alert    Nausea/Vomiting: no nausea/vomiting    Complications: none    Transfer of care protocol was followed      Last vitals:   Visit Vitals  /73 (BP Location: Right arm, Patient Position: Lying)   Pulse 82   Temp 36.4 °C (97.5 °F) (Oral)   Resp 12   Wt 122.4 kg (269 lb 13 oz)   SpO2 96%   BMI 41.02 kg/m²

## 2023-08-23 NOTE — DISCHARGE INSTRUCTIONS
[facility]  Office # [phone]    Discharge Instructions for Same Day Surgery     Call the office for and appointment if one has not already been made.     Diet: Drink plenty of fluids the first 48 hours and you may resume your   usual diet.     Dermabond / Prineotape    or a material like it was used on your incision.   It is like a liquid glue.   Do not peel or try to remove it it will start to fall off in 7-10 days on its' own.   It is OK to shower, pat dry, do not apply any creams or lotions.    No tub baths, swimming pools, hot tubs or submersion of the incision until your surgeon says it's ok.        Activity: No heavy lifting (over 10 pounds), pushing or pulling until your   post op visit. Your doctor's office may have told you to limit your lifting to less weight, or even no weight.  Be sure to follow those instructions.    Note: You may ride in a car and you may drive when comfortable.     Do not drive, drink alcohol, or sign legal documents for 24 hours, or if taking narcotic pain medication.      Medical: Call the doctor for any of the following problems: fever above 101,   severe pain, bleeding, or abdominal distention (swelling).   If constipated you may take any stool softener you choose.     Occasionally small areas of skin numbness or an unpleasant skin sensation can result. Also, you may find that your incision is swollen and tender for a few days.  Some redness around sutures and staples is a normal reaction, but if the discomfort persists or worsens, call you doctor.

## 2023-08-23 NOTE — ANESTHESIA POSTPROCEDURE EVALUATION
Anesthesia Post Evaluation    Patient: Pepe Miguel    Procedure(s) Performed:  EXCISION, MASS, BACK (Right)    Final Anesthesia Type: general (natural airway)      Patient location during evaluation: PACU  Patient participation: Yes- Able to Participate  Level of consciousness: awake and alert  Post-procedure vital signs: reviewed and stable  Pain management: adequate  Airway patency: patent  CHE mitigation strategies: Multimodal analgesia and Extubation while patient is awake  PONV status at discharge: No PONV  Anesthetic complications: no      Cardiovascular status: blood pressure returned to baseline and hemodynamically stable  Respiratory status: spontaneous ventilation and room air  Hydration status: euvolemic  Follow-up not needed.        Vitals Value Taken Time   /73 08/23/23 1134   Temp 36.4 °C (97.5 °F) 08/23/23 1134   Pulse 82 08/23/23 1134   Resp 12 08/23/23 1134   SpO2 96 % 08/23/23 1134       No case tracking events are documented in the log.      Pain/Mic Score: No data recorded

## 2023-08-23 NOTE — DISCHARGE SUMMARY
[facility] - Surgery  Discharge Note  Short Stay    Procedure(s) (LRB):  EXCISION, MASS, BACK (Right)      OUTCOME: Patient tolerated treatment/procedure well without complication and is now ready for discharge.    DISPOSITION: Home or Self Care    FINAL DIAGNOSIS: soft tissue mass of the back    FOLLOWUP: In clinic    DISCHARGE INSTRUCTIONS:    Discharge Procedure Orders   Diet Adult Regular     Notify your health care provider if you experience any of the following:  temperature >100.4     Notify your health care provider if you experience any of the following:  persistent nausea and vomiting or diarrhea     Notify your health care provider if you experience any of the following:  severe uncontrolled pain     Notify your health care provider if you experience any of the following:  redness, tenderness, or signs of infection (pain, swelling, redness, odor or green/yellow discharge around incision site)     Notify your health care provider if you experience any of the following:  difficulty breathing or increased cough     No dressing needed   Order Comments: Ok to shower, daily w soap and water, rinse, do not soak for 2 wks     Activity as tolerated        TIME SPENT ON DISCHARGE: 12 minutes

## 2023-08-23 NOTE — OP NOTE
[facility] [place]  General Surgery  Operative Note    SUMMARY     Date of Procedure: 8/23/2023     Procedure: Procedure(s) (LRB):  EXCISION, MASS, BACK (Right)       Surgeon(s) and Role:     * John Carlson MD - Primary    Assisting Surgeon: Washington Diamond MD    Pre-Operative Diagnosis: Mass of subcutaneous tissue of back [R22.2]    Post-Operative Diagnosis: Post-Op Diagnosis Codes:     * Mass of subcutaneous tissue of back [R22.2]    Anesthesia: Local MAC    Indications: Pepe Miguel is a 48 y.o. male with a back mass. This was consistent with imaging and physical exam findings. We recommended excision. The patient was in agreement with the plan and did sign informed consent.     Procedure:   The patient was identified in the preoperative area and all questions were answered. Informed consent was verified. He was then transported to the operating room and placed on the table in the left lateral decubitus position. Anesthesia was induced without complication. The posterior neck was then prepped and draped in the usual standard fashion. Prophylactic antibiotics were administered. A timeout was performed verifying correct patient and procedure.     We made an incision sharply over the mass. Electrocautery was used to dissect down to the mass. The mass was dissected with a mix of electrocautery and blunt dissection. The capsule was entered during dissection. The mass was removed in pieces and was grossly consistent with a lipoma approx [id number]. The wound was irrigated. Hemostasis was achieved and there was no bleeding. The wound was closed in layers with 2-0 vicryl, 3-0 vicryl, and 4-0 monocryl. Dermabond was applied.     The patient was then awaken from anesthesia and transported to the PACU in stable condition.   Dr. Carlson was scrubbed for the case.      Complications: No    Estimated Blood Loss (EBL): * No values recorded between 8/23/2023 10:53 AM and 8/23/2023 11:29 AM *           Implants: * No  implants in log *    Specimens:   Specimen (24h ago, onward)       Start     Ordered    08/23/23 1113  Specimen to Pathology, Surgery General Surgery  Once        Comments: Pre-op Diagnosis: Mass of subcutaneous tissue of back [R22.2]Procedure(s):EXCISION, MASS, BACK Number of specimens: 1Name of specimens: Right back mass     References:    Click here for ordering Quick Tip   Question Answer Comment   Procedure Type: General Surgery    Specimen Class: Routine/Screening    Which provider would you like to cc? SUSHILA CARLSON    Release to patient Immediate        08/23/23 1113    Pending  Specimen to Pathology, Surgery General Surgery  Once        Comments: Pre-op Diagnosis: Mass of subcutaneous tissue of back [R22.2]Procedure(s):EXCISION, MASS, BACK Number of specimens: 1Name of specimens: Right back mass     References:    Click here for ordering Quick Tip   Question Answer Comment   Procedure Type: General Surgery    Specimen Class: Routine/Screening    Which provider would you like to cc? SUSHILA CARLSON    Release to patient Immediate        Pending                            Condition: Stable    Disposition: PACU - hemodynamically stable.    Attestation: Dr. Carlson was scrubbed and present for all parts of the procedure.    Washington Diamond MD  [facility]

## 2023-08-28 LAB
FINAL PATHOLOGIC DIAGNOSIS: NORMAL
GROSS: NORMAL
Lab: NORMAL

## 2023-08-29 ENCOUNTER — OFFICE VISIT (OUTPATIENT)
Dept: SURGERY | Facility: CLINIC | Age: 48
End: 2023-08-29
Payer: COMMERCIAL

## 2023-08-29 VITALS
BODY MASS INDEX: 40.89 KG/M2 | HEART RATE: 63 BPM | DIASTOLIC BLOOD PRESSURE: 90 MMHG | SYSTOLIC BLOOD PRESSURE: 139 MMHG | HEIGHT: 68 IN | WEIGHT: 269.81 LBS

## 2023-08-29 DIAGNOSIS — R22.2 MASS OF SUBCUTANEOUS TISSUE OF BACK: Primary | ICD-10-CM

## 2023-08-29 PROCEDURE — 99024 POSTOP FOLLOW-UP VISIT: CPT | Mod: S$GLB,,, | Performed by: SURGERY

## 2023-08-29 PROCEDURE — 99024 PR POST-OP FOLLOW-UP VISIT: ICD-10-PCS | Mod: S$GLB,,, | Performed by: SURGERY

## 2023-08-29 PROCEDURE — 3080F PR MOST RECENT DIASTOLIC BLOOD PRESSURE >= 90 MM HG: ICD-10-PCS | Mod: CPTII,S$GLB,, | Performed by: SURGERY

## 2023-08-29 PROCEDURE — 3075F PR MOST RECENT SYSTOLIC BLOOD PRESS GE 130-139MM HG: ICD-10-PCS | Mod: CPTII,S$GLB,, | Performed by: SURGERY

## 2023-08-29 PROCEDURE — 3008F BODY MASS INDEX DOCD: CPT | Mod: CPTII,S$GLB,, | Performed by: SURGERY

## 2023-08-29 PROCEDURE — 1159F MED LIST DOCD IN RCRD: CPT | Mod: CPTII,S$GLB,, | Performed by: SURGERY

## 2023-08-29 PROCEDURE — 3008F PR BODY MASS INDEX (BMI) DOCUMENTED: ICD-10-PCS | Mod: CPTII,S$GLB,, | Performed by: SURGERY

## 2023-08-29 PROCEDURE — 99999 PR PBB SHADOW E&M-EST. PATIENT-LVL III: CPT | Mod: PBBFAC,,, | Performed by: SURGERY

## 2023-08-29 PROCEDURE — 3075F SYST BP GE 130 - 139MM HG: CPT | Mod: CPTII,S$GLB,, | Performed by: SURGERY

## 2023-08-29 PROCEDURE — 3080F DIAST BP >= 90 MM HG: CPT | Mod: CPTII,S$GLB,, | Performed by: SURGERY

## 2023-08-29 PROCEDURE — 1159F PR MEDICATION LIST DOCUMENTED IN MEDICAL RECORD: ICD-10-PCS | Mod: CPTII,S$GLB,, | Performed by: SURGERY

## 2023-08-29 PROCEDURE — 3044F PR MOST RECENT HEMOGLOBIN A1C LEVEL <7.0%: ICD-10-PCS | Mod: CPTII,S$GLB,, | Performed by: SURGERY

## 2023-08-29 PROCEDURE — 3044F HG A1C LEVEL LT 7.0%: CPT | Mod: CPTII,S$GLB,, | Performed by: SURGERY

## 2023-08-29 PROCEDURE — 99999 PR PBB SHADOW E&M-EST. PATIENT-LVL III: ICD-10-PCS | Mod: PBBFAC,,, | Performed by: SURGERY

## 2023-08-29 NOTE — PROGRESS NOTES
Surgery Clinic Note    Pepe Miguel is a 48 y.o. year old male in clinic today for follow up of back/neck lipoma. Doing well.  No f/c/n/v/sob/cp    ROS:  Negative except above    Pathology:lipoma    PE:  Vitals:    08/29/23 0830   BP: (!) 139/90   Pulse: 63       NAD  No belabored breathing  Incision c/d/I    A/P:  Pepe Miguel is a 48 y.o. year old male s/p lipoma excision of back/upper neck    -rtc prn    John Cralson  General Surgery - [facility]  8/29/2023

## 2023-12-18 ENCOUNTER — CLINICAL SUPPORT (OUTPATIENT)
Dept: ENDOSCOPY | Facility: HOSPITAL | Age: 48
End: 2023-12-18
Attending: STUDENT IN AN ORGANIZED HEALTH CARE EDUCATION/TRAINING PROGRAM
Payer: COMMERCIAL

## 2023-12-18 ENCOUNTER — PATIENT MESSAGE (OUTPATIENT)
Dept: ENDOSCOPY | Facility: HOSPITAL | Age: 48
End: 2023-12-18

## 2023-12-18 VITALS — HEIGHT: 68 IN | BODY MASS INDEX: 40.77 KG/M2 | WEIGHT: 269 LBS

## 2023-12-18 DIAGNOSIS — Z12.12 ENCOUNTER FOR COLORECTAL CANCER SCREENING: ICD-10-CM

## 2023-12-18 DIAGNOSIS — Z12.11 ENCOUNTER FOR COLORECTAL CANCER SCREENING: ICD-10-CM

## 2023-12-18 NOTE — PLAN OF CARE
Spoke to pt. Job GripeO in late April. New refendo placed and pt. Verbalized understanding to call back to check for openings

## 2024-01-08 ENCOUNTER — PATIENT OUTREACH (OUTPATIENT)
Dept: ADMINISTRATIVE | Facility: HOSPITAL | Age: 49
End: 2024-01-08
Payer: COMMERCIAL

## 2024-01-08 NOTE — PROGRESS NOTES
Population Health Chart Review & Patient Outreach Details     Dr. Dueñas no longer with [facility],need establish care with another provider - need labs prior to visit. If want to follow Dr Dueñas can go to [facility] at [address] [place] [address] [phone], [place] [id number] - phone: [phone].  Due for overdue HM (colon screening), need to get recent records if already done. If not done, orders/referrals need to be place and schedule. Please forward messages to me.   Left message for patient to return call.    Further Action Needed If Patient Returns Outreach:            Updates Requested / Reviewed:     [x]  Care Everywhere    []     []  External Sources ([facility], [facility], [facility], etc.)    [] [facility]   [] [facility]   [] Other:    [x]  Care Team Updated   []  Removed  or Duplicate Orders   []  Immunization Reconciliation Completed / Queried    [] [place]   [] [place]   [] [place]   [] [place]      Health Maintenance Topics Addressed and Outreach Outcomes / Actions Taken:             Breast Cancer Screening []  Mammogram Order Placed    []  Mammogram Screening Scheduled    []  External Records Requested & Care Team Updated if Applicable    []  External Records Uploaded & Care Team Updated if Applicable    []  Pt Declined Scheduling Mammogram    []  Pt Will Schedule with External Provider / Order Routed & Care Team Updated if Applicable              Cervical Cancer Screening []  Pap Smear Scheduled in Primary Care or OBGYN    []  External Records Requested & Care Team Updated if Applicable       []  External Records Uploaded, Care Team Updated, & History Updated if Applicable    []  Patient Declined Scheduling Pap Smear    []  Patient Will Schedule with External Provider & Care Team Updated if Applicable                  Colorectal Cancer Screening []  Colonoscopy Case Request / Referral / Home Test Order Placed    []  External Records Requested & Care Team Updated if Applicable    []  External  Records Uploaded, Care Team Updated, & History Updated if Applicable    []  Patient Declined Completing Colon Cancer Screening    []  Patient Will Schedule with External Provider & Care Team Updated if Applicable    []  Fit Kit Mailed (add the SmartPhrase under additional notes)    []  Reminded Patient to Complete Home Test                Diabetic Eye Exam []  Eye Exam Screening Order Placed    []  Eye Camera Scheduled or Optometry/Ophthalmology Referral Placed    []  External Records Requested & Care Team Updated if Applicable    []  External Records Uploaded, Care Team Updated, & History Updated if Applicable    []  Patient Declined Scheduling Eye Exam    []  Patient Will Schedule with External Provider & Care Team Updated if Applicable             Blood Pressure Control []  Primary Care Follow Up Visit Scheduled     []  Remote Blood Pressure Reading Captured    []  Patient Declined Remote Reading or Scheduling Appt - Escalated to PCP    []  Patient Will Call Back or Send Portal Message with Reading                 HbA1c & Other Labs []  Overdue Lab(s) Ordered    []  Overdue Lab(s) Scheduled    []  External Records Uploaded & Care Team Updated if Applicable    []  Primary Care Follow Up Visit Scheduled     []  Reminded Patient to Complete A1c Home Test    []  Patient Declined Scheduling Labs or Will Call Back to Schedule    []  Patient Will Schedule with External Provider / Order Routed, & Care Team Updated if Applicable           Primary Care Appointment []  Primary Care Appt Scheduled    []  Patient Declined Scheduling or Will Call Back to Schedule    []  Pt Established with External Provider, Updated Care Team, & Informed Pt to Notify Payor if Applicable           Medication Adherence /    Statin Use []  Primary Care Appointment Scheduled    []  Patient Reminded to  Prescription    []  Patient Declined, Provider Notified if Needed    []  Sent Provider Message to Review to Evaluate Pt for Statin, Add  Exclusion Dx Codes, Document   Exclusion in Problem List, Change Statin Intensity Level to Moderate or High Intensity if Applicable                Osteoporosis Screening []  Dexa Order Placed    []  Dexa Appointment Scheduled    []  External Records Requested & Care Team Updated    []  External Records Uploaded, Care Team Updated, & History Updated if Applicable    []  Patient Declined Scheduling Dexa or Will Call Back to Schedule    []  Patient Will Schedule with External Provider / Order Routed & Care Team Updated if Applicable       Additional Notes:

## 2024-06-11 DIAGNOSIS — E78.5 HYPERLIPIDEMIA, UNSPECIFIED HYPERLIPIDEMIA TYPE: ICD-10-CM

## 2024-06-11 DIAGNOSIS — I10 PRIMARY HYPERTENSION: ICD-10-CM

## 2024-06-11 RX ORDER — LOSARTAN POTASSIUM AND HYDROCHLOROTHIAZIDE 12.5; 1 MG/1; MG/1
1 TABLET ORAL
Qty: 90 TABLET | Refills: 3 | Status: SHIPPED | OUTPATIENT
Start: 2024-06-11 | End: 2024-06-19 | Stop reason: SDUPTHER

## 2024-06-11 RX ORDER — ROSUVASTATIN CALCIUM 10 MG/1
10 TABLET, COATED ORAL NIGHTLY
Qty: 90 TABLET | Refills: 3 | Status: SHIPPED | OUTPATIENT
Start: 2024-06-11 | End: 2024-06-19 | Stop reason: SDUPTHER

## 2024-06-19 ENCOUNTER — LAB VISIT (OUTPATIENT)
Dept: LAB | Facility: HOSPITAL | Age: 49
End: 2024-06-19
Attending: INTERNAL MEDICINE
Payer: COMMERCIAL

## 2024-06-19 ENCOUNTER — OFFICE VISIT (OUTPATIENT)
Dept: FAMILY MEDICINE | Facility: CLINIC | Age: 49
End: 2024-06-19
Payer: COMMERCIAL

## 2024-06-19 VITALS
DIASTOLIC BLOOD PRESSURE: 88 MMHG | HEART RATE: 65 BPM | WEIGHT: 266.31 LBS | SYSTOLIC BLOOD PRESSURE: 126 MMHG | OXYGEN SATURATION: 98 % | BODY MASS INDEX: 40.36 KG/M2 | HEIGHT: 68 IN | TEMPERATURE: 99 F

## 2024-06-19 DIAGNOSIS — Z00.00 ANNUAL PHYSICAL EXAM: ICD-10-CM

## 2024-06-19 DIAGNOSIS — E66.01 CLASS 3 OBESITY: ICD-10-CM

## 2024-06-19 DIAGNOSIS — Z00.00 ANNUAL PHYSICAL EXAM: Primary | ICD-10-CM

## 2024-06-19 DIAGNOSIS — I10 PRIMARY HYPERTENSION: ICD-10-CM

## 2024-06-19 DIAGNOSIS — Z12.11 COLON CANCER SCREENING: ICD-10-CM

## 2024-06-19 DIAGNOSIS — E78.5 HYPERLIPIDEMIA, UNSPECIFIED HYPERLIPIDEMIA TYPE: ICD-10-CM

## 2024-06-19 PROBLEM — E78.49 OTHER HYPERLIPIDEMIA: Status: RESOLVED | Noted: 2022-12-23 | Resolved: 2024-06-19

## 2024-06-19 PROBLEM — E66.813 CLASS 3 OBESITY: Status: ACTIVE | Noted: 2024-06-19

## 2024-06-19 LAB
ALBUMIN SERPL BCP-MCNC: 4 G/DL (ref 3.5–5.2)
ALP SERPL-CCNC: 50 U/L (ref 55–135)
ALT SERPL W/O P-5'-P-CCNC: 30 U/L (ref 10–44)
ANION GAP SERPL CALC-SCNC: 8 MMOL/L (ref 8–16)
AST SERPL-CCNC: 32 U/L (ref 10–40)
BASOPHILS # BLD AUTO: 0.06 K/UL (ref 0–0.2)
BASOPHILS NFR BLD: 0.6 % (ref 0–1.9)
BILIRUB SERPL-MCNC: 0.6 MG/DL (ref 0.1–1)
BUN SERPL-MCNC: 13 MG/DL (ref 6–20)
CALCIUM SERPL-MCNC: 10 MG/DL (ref 8.7–10.5)
CHLORIDE SERPL-SCNC: 105 MMOL/L (ref 95–110)
CHOLEST SERPL-MCNC: 158 MG/DL (ref 120–199)
CHOLEST/HDLC SERPL: 3.6 {RATIO} (ref 2–5)
CO2 SERPL-SCNC: 26 MMOL/L (ref 23–29)
CREAT SERPL-MCNC: 1 MG/DL (ref 0.5–1.4)
DIFFERENTIAL METHOD BLD: ABNORMAL
EOSINOPHIL # BLD AUTO: 0.2 K/UL (ref 0–0.5)
EOSINOPHIL NFR BLD: 2.1 % (ref 0–8)
ERYTHROCYTE [DISTWIDTH] IN BLOOD BY AUTOMATED COUNT: 14.1 % (ref 11.5–14.5)
EST. GFR  (NO RACE VARIABLE): >60 ML/MIN/1.73 M^2
ESTIMATED AVG GLUCOSE: 111 MG/DL (ref 68–131)
GLUCOSE SERPL-MCNC: 83 MG/DL (ref 70–110)
HBA1C MFR BLD: 5.5 % (ref 4–5.6)
HCT VFR BLD AUTO: 46.8 % (ref 40–54)
HDLC SERPL-MCNC: 44 MG/DL (ref 40–75)
HDLC SERPL: 27.8 % (ref 20–50)
HGB BLD-MCNC: 15.9 G/DL (ref 14–18)
IMM GRANULOCYTES # BLD AUTO: 0.03 K/UL (ref 0–0.04)
IMM GRANULOCYTES NFR BLD AUTO: 0.3 % (ref 0–0.5)
LDLC SERPL CALC-MCNC: 87.8 MG/DL (ref 63–159)
LYMPHOCYTES # BLD AUTO: 2.1 K/UL (ref 1–4.8)
LYMPHOCYTES NFR BLD: 20.6 % (ref 18–48)
MCH RBC QN AUTO: 30.5 PG (ref 27–31)
MCHC RBC AUTO-ENTMCNC: 34 G/DL (ref 32–36)
MCV RBC AUTO: 90 FL (ref 82–98)
MONOCYTES # BLD AUTO: 1.1 K/UL (ref 0.3–1)
MONOCYTES NFR BLD: 10.8 % (ref 4–15)
NEUTROPHILS # BLD AUTO: 6.6 K/UL (ref 1.8–7.7)
NEUTROPHILS NFR BLD: 65.6 % (ref 38–73)
NONHDLC SERPL-MCNC: 114 MG/DL
NRBC BLD-RTO: 0 /100 WBC
PLATELET # BLD AUTO: 252 K/UL (ref 150–450)
PMV BLD AUTO: 11.1 FL (ref 9.2–12.9)
POTASSIUM SERPL-SCNC: 4.2 MMOL/L (ref 3.5–5.1)
PROT SERPL-MCNC: 7.4 G/DL (ref 6–8.4)
RBC # BLD AUTO: 5.21 M/UL (ref 4.6–6.2)
SODIUM SERPL-SCNC: 139 MMOL/L (ref 136–145)
TRIGL SERPL-MCNC: 131 MG/DL (ref 30–150)
WBC # BLD AUTO: 10.05 K/UL (ref 3.9–12.7)

## 2024-06-19 PROCEDURE — 36415 COLL VENOUS BLD VENIPUNCTURE: CPT | Mod: PO | Performed by: INTERNAL MEDICINE

## 2024-06-19 PROCEDURE — 83036 HEMOGLOBIN GLYCOSYLATED A1C: CPT | Performed by: INTERNAL MEDICINE

## 2024-06-19 PROCEDURE — 80053 COMPREHEN METABOLIC PANEL: CPT | Performed by: INTERNAL MEDICINE

## 2024-06-19 PROCEDURE — 3008F BODY MASS INDEX DOCD: CPT | Mod: CPTII,S$GLB,, | Performed by: INTERNAL MEDICINE

## 2024-06-19 PROCEDURE — 1159F MED LIST DOCD IN RCRD: CPT | Mod: CPTII,S$GLB,, | Performed by: INTERNAL MEDICINE

## 2024-06-19 PROCEDURE — 1160F RVW MEDS BY RX/DR IN RCRD: CPT | Mod: CPTII,S$GLB,, | Performed by: INTERNAL MEDICINE

## 2024-06-19 PROCEDURE — 3074F SYST BP LT 130 MM HG: CPT | Mod: CPTII,S$GLB,, | Performed by: INTERNAL MEDICINE

## 2024-06-19 PROCEDURE — 99999 PR PBB SHADOW E&M-EST. PATIENT-LVL IV: CPT | Mod: PBBFAC,,, | Performed by: INTERNAL MEDICINE

## 2024-06-19 PROCEDURE — 3079F DIAST BP 80-89 MM HG: CPT | Mod: CPTII,S$GLB,, | Performed by: INTERNAL MEDICINE

## 2024-06-19 PROCEDURE — 99396 PREV VISIT EST AGE 40-64: CPT | Mod: S$GLB,,, | Performed by: INTERNAL MEDICINE

## 2024-06-19 PROCEDURE — 85025 COMPLETE CBC W/AUTO DIFF WBC: CPT | Performed by: INTERNAL MEDICINE

## 2024-06-19 PROCEDURE — 80061 LIPID PANEL: CPT | Performed by: INTERNAL MEDICINE

## 2024-06-19 RX ORDER — CHOLECALCIFEROL (VITAMIN D3) 25 MCG
1000 TABLET ORAL DAILY
Qty: 90 TABLET | Refills: 3 | Status: SHIPPED | OUTPATIENT
Start: 2024-06-19 | End: 2025-06-19

## 2024-06-19 RX ORDER — LOSARTAN POTASSIUM AND HYDROCHLOROTHIAZIDE 12.5; 1 MG/1; MG/1
1 TABLET ORAL DAILY
Qty: 90 TABLET | Refills: 3 | Status: SHIPPED | OUTPATIENT
Start: 2024-06-19 | End: 2025-06-19

## 2024-06-19 RX ORDER — ROSUVASTATIN CALCIUM 10 MG/1
10 TABLET, COATED ORAL NIGHTLY
Qty: 90 TABLET | Refills: 3 | Status: SHIPPED | OUTPATIENT
Start: 2024-06-19 | End: 2025-06-19

## 2024-06-19 NOTE — ASSESSMENT & PLAN NOTE
Wt Readings from Last 3 Encounters:   06/19/24 1117 120.8 kg (266 lb 5.1 oz)   12/18/23 0729 122 kg (269 lb)   08/29/23 0830 122.4 kg (269 lb 13.5 oz)     - counseled of the importance of diet and exercise. Talked about calorie deficit and intermittent fasting. Cutting off carbs. Recommended exercise at least twice a week, with increased cardio and weight lifting.

## 2024-06-19 NOTE — PROGRESS NOTES
Chief Complaint: Annual Exam, Medication Refill, and Establish Care      Pepe Miguel  is a 48 y.o. year old patient who presents today for annual     Patient reports no issues. Requesting refills for his meds.     Past Medical History:   Diagnosis Date    Essential (primary) hypertension     Insomnia, unspecified     Mixed hyperlipidemia     Personal history of nicotine dependence     Vitamin D deficiency, unspecified        Past Surgical History:   Procedure Laterality Date    EXCISION OF MASS OF BACK Right 2023    Procedure: EXCISION, MASS, BACK;  Surgeon: John Carlson MD;  Location: [facility];  Service: General;  Laterality: Right;  Right upper back towards the neck  RN PREOP 2023 --JM------BMI--41.02    FRACTURE SURGERY      pinky finger Right         No family history on file.     Social History     Socioeconomic History    Marital status:    Tobacco Use    Smoking status: Former     Current packs/day: 0.00     Types: Cigarettes     Start date:      Quit date:      Years since quittin.4    Smokeless tobacco: Never   Substance and Sexual Activity    Alcohol use: Yes     Alcohol/week: 3.0 standard drinks of alcohol     Types: 3 Standard drinks or equivalent per week    Drug use: Never    Sexual activity: Yes     Partners: Female     Social Determinants of Health     Financial Resource Strain: Low Risk  (2024)    Overall Financial Resource Strain (CARDIA)     Difficulty of Paying Living Expenses: Not hard at all   Food Insecurity: No Food Insecurity (2024)    Hunger Vital Sign     Worried About Running Out of Food in the Last Year: Never true     Ran Out of Food in the Last Year: Never true   Physical Activity: Insufficiently Active (2024)    Exercise Vital Sign     Days of Exercise per Week: 1 day     Minutes of Exercise per Session: 30 min   Stress: No Stress Concern Present (2024)    [employer] - Occupational  Stress Questionnaire     Feeling of Stress : Not at all   Housing Stability: Unknown (6/17/2024)    Housing Stability Vital Sign     Unable to Pay for Housing in the Last Year: No         Current Outpatient Medications:     losartan-hydrochlorothiazide 100-12.5 mg (HYZAAR) 100-12.5 mg Tab, Take 1 tablet by mouth once daily., Disp: 90 tablet, Rfl: 3    rosuvastatin (CRESTOR) 10 MG tablet, Take 1 tablet (10 mg total) by mouth every evening., Disp: 90 tablet, Rfl: 3    vitamin D (VITAMIN D3) 1000 units Tab, Take 1 tablet (1,000 Units total) by mouth once daily., Disp: 90 tablet, Rfl: 3     Review of Systems   HENT:  Negative for congestion.    Eyes:  Negative for blurred vision.   Respiratory:  Negative for cough and shortness of breath.    Cardiovascular:  Negative for chest pain and leg swelling.   Gastrointestinal:  Negative for abdominal pain, blood in stool, constipation, diarrhea, melena and nausea.   Genitourinary:  Negative for dysuria.   Musculoskeletal:  Negative for joint pain.   Neurological:  Negative for headaches.   Psychiatric/Behavioral:  Negative for depression. The patient is not nervous/anxious.         Objective:      Vitals:    06/19/24 1117   BP: 126/88   Pulse: 65   Temp: 98.5 °F (36.9 °C)       Physical Exam  Vitals and nursing note reviewed.   Constitutional:       Appearance: Normal appearance. He is obese.   HENT:      Head: Normocephalic and atraumatic.   Cardiovascular:      Rate and Rhythm: Normal rate and regular rhythm.   Pulmonary:      Effort: Pulmonary effort is normal.      Breath sounds: Normal breath sounds. No wheezing or rales.   Abdominal:      General: Bowel sounds are normal.      Palpations: Abdomen is soft.      Tenderness: There is no abdominal tenderness.   Musculoskeletal:      Right lower leg: No edema.      Left lower leg: No edema.   Skin:     General: Skin is warm and dry.   Neurological:      General: No focal deficit present.      Mental Status: He is alert and  oriented to person, place, and time.   Psychiatric:         Mood and Affect: Mood normal.         Behavior: Behavior normal.          Assessment:       1. Annual physical exam    2. Primary hypertension    3. Hyperlipidemia, unspecified hyperlipidemia type    4. Colon cancer screening    5. Class 3 obesity          Plan:   1. Annual physical exam  Assessment & Plan:  Discussed HCM with patient  - patient up to date with available vaccines. Discussed the importance of vaccines.   - annual labs ordered  -   cologuard ordered  - advised patient to follow up with ophthalmologist and dentist every year  - reviewed medical, surgical, family and social history      Orders:  -     CBC Auto Differential; Future; Expected date: 06/19/2024  -     Comprehensive Metabolic Panel; Future; Expected date: 06/19/2024  -     Hemoglobin A1C; Future; Expected date: 06/19/2024  -     Lipid Panel; Future; Expected date: 06/19/2024    2. Primary hypertension  Assessment & Plan:  Chronic, controlled     - continue Hyzaar     Orders:  -     losartan-hydrochlorothiazide 100-12.5 mg (HYZAAR) 100-12.5 mg Tab; Take 1 tablet by mouth once daily.  Dispense: 90 tablet; Refill: 3    3. Hyperlipidemia, unspecified hyperlipidemia type  Assessment & Plan:  Chronic, controlled     - lipid panel   - continue crestor    Orders:  -     rosuvastatin (CRESTOR) 10 MG tablet; Take 1 tablet (10 mg total) by mouth every evening.  Dispense: 90 tablet; Refill: 3    4. Colon cancer screening  -     Cologuard Screening (Multitarget Stool DNA); Future; Expected date: 06/19/2024    5. Class 3 obesity  Assessment & Plan:  Wt Readings from Last 3 Encounters:   06/19/24 1117 120.8 kg (266 lb 5.1 oz)   12/18/23 0729 122 kg (269 lb)   08/29/23 0830 122.4 kg (269 lb 13.5 oz)     - counseled of the importance of diet and exercise. Talked about calorie deficit and intermittent fasting. Cutting off carbs. Recommended exercise at least twice a week, with increased cardio and  weight lifting.           Other orders  -     vitamin D (VITAMIN D3) 1000 units Tab; Take 1 tablet (1,000 Units total) by mouth once daily.  Dispense: 90 tablet; Refill: 3         Follow up in about 1 year (around 6/19/2025).

## 2024-06-19 NOTE — ASSESSMENT & PLAN NOTE
Discussed HCM with patient  - patient up to date with available vaccines. Discussed the importance of vaccines.   - annual labs ordered  -   cologuard ordered  - advised patient to follow up with ophthalmologist and dentist every year  - reviewed medical, surgical, family and social history

## 2024-06-19 NOTE — PROGRESS NOTES
Health Maintenance Due   Topic     Colorectal Cancer Screening  Consult pcp    COVID-19 Vaccine (5 - 2023-24 season) Not offered at this office

## 2024-08-05 ENCOUNTER — PATIENT OUTREACH (OUTPATIENT)
Dept: ADMINISTRATIVE | Facility: HOSPITAL | Age: 49
End: 2024-08-05
Payer: COMMERCIAL

## 2024-08-12 ENCOUNTER — PATIENT MESSAGE (OUTPATIENT)
Dept: FAMILY MEDICINE | Facility: CLINIC | Age: 49
End: 2024-08-12
Payer: COMMERCIAL

## 2024-09-23 PROBLEM — Z00.00 ANNUAL PHYSICAL EXAM: Status: RESOLVED | Noted: 2024-06-19 | Resolved: 2024-09-23

## 2025-04-21 ENCOUNTER — RESULTS FOLLOW-UP (OUTPATIENT)
Dept: FAMILY MEDICINE | Facility: CLINIC | Age: 50
End: 2025-04-21

## 2025-06-26 DIAGNOSIS — I10 PRIMARY HYPERTENSION: ICD-10-CM

## 2025-08-19 ENCOUNTER — LAB VISIT (OUTPATIENT)
Dept: LAB | Facility: HOSPITAL | Age: 50
End: 2025-08-19
Attending: INTERNAL MEDICINE
Payer: COMMERCIAL

## 2025-08-19 ENCOUNTER — OFFICE VISIT (OUTPATIENT)
Dept: FAMILY MEDICINE | Facility: CLINIC | Age: 50
End: 2025-08-19
Payer: COMMERCIAL

## 2025-08-19 VITALS
RESPIRATION RATE: 18 BRPM | HEART RATE: 97 BPM | HEIGHT: 68 IN | DIASTOLIC BLOOD PRESSURE: 86 MMHG | OXYGEN SATURATION: 97 % | BODY MASS INDEX: 40.26 KG/M2 | TEMPERATURE: 98 F | WEIGHT: 265.63 LBS | SYSTOLIC BLOOD PRESSURE: 120 MMHG

## 2025-08-19 DIAGNOSIS — Z00.00 ANNUAL PHYSICAL EXAM: Primary | ICD-10-CM

## 2025-08-19 DIAGNOSIS — E66.813 CLASS 3 SEVERE OBESITY DUE TO EXCESS CALORIES WITH SERIOUS COMORBIDITY AND BODY MASS INDEX (BMI) OF 40.0 TO 44.9 IN ADULT: ICD-10-CM

## 2025-08-19 DIAGNOSIS — Z00.00 ANNUAL PHYSICAL EXAM: ICD-10-CM

## 2025-08-19 DIAGNOSIS — E78.5 HYPERLIPIDEMIA, UNSPECIFIED HYPERLIPIDEMIA TYPE: ICD-10-CM

## 2025-08-19 DIAGNOSIS — H52.4 PRESBYOPIA: ICD-10-CM

## 2025-08-19 DIAGNOSIS — Z12.5 PROSTATE CANCER SCREENING: ICD-10-CM

## 2025-08-19 DIAGNOSIS — I10 PRIMARY HYPERTENSION: ICD-10-CM

## 2025-08-19 LAB
ABSOLUTE EOSINOPHIL (OHS): 0.22 K/UL
ABSOLUTE MONOCYTE (OHS): 1.27 K/UL (ref 0.3–1)
ABSOLUTE NEUTROPHIL COUNT (OHS): 7.27 K/UL (ref 1.8–7.7)
ALBUMIN SERPL BCP-MCNC: 4.2 G/DL (ref 3.5–5.2)
ALP SERPL-CCNC: 57 UNIT/L (ref 40–150)
ALT SERPL W/O P-5'-P-CCNC: 42 UNIT/L (ref 0–55)
ANION GAP (OHS): 12 MMOL/L (ref 8–16)
AST SERPL-CCNC: 36 UNIT/L (ref 0–50)
BASOPHILS # BLD AUTO: 0.06 K/UL
BASOPHILS NFR BLD AUTO: 0.5 %
BILIRUB SERPL-MCNC: 0.7 MG/DL (ref 0.1–1)
BUN SERPL-MCNC: 17 MG/DL (ref 6–20)
CALCIUM SERPL-MCNC: 9.5 MG/DL (ref 8.7–10.5)
CHLORIDE SERPL-SCNC: 104 MMOL/L (ref 95–110)
CHOLEST SERPL-MCNC: 152 MG/DL (ref 120–199)
CHOLEST/HDLC SERPL: 3.6 {RATIO} (ref 2–5)
CO2 SERPL-SCNC: 23 MMOL/L (ref 23–29)
CREAT SERPL-MCNC: 1 MG/DL (ref 0.5–1.4)
EAG (OHS): 105 MG/DL (ref 68–131)
ERYTHROCYTE [DISTWIDTH] IN BLOOD BY AUTOMATED COUNT: 13.8 % (ref 11.5–14.5)
GFR SERPLBLD CREATININE-BSD FMLA CKD-EPI: >60 ML/MIN/1.73/M2
GLUCOSE SERPL-MCNC: 89 MG/DL (ref 70–110)
HBA1C MFR BLD: 5.3 % (ref 4–5.6)
HCT VFR BLD AUTO: 49.5 % (ref 40–54)
HDLC SERPL-MCNC: 42 MG/DL (ref 40–75)
HDLC SERPL: 27.6 % (ref 20–50)
HGB BLD-MCNC: 16.5 GM/DL (ref 14–18)
IMM GRANULOCYTES # BLD AUTO: 0.04 K/UL (ref 0–0.04)
IMM GRANULOCYTES NFR BLD AUTO: 0.4 % (ref 0–0.5)
LDLC SERPL CALC-MCNC: 88.6 MG/DL (ref 63–159)
LYMPHOCYTES # BLD AUTO: 2.06 K/UL (ref 1–4.8)
MCH RBC QN AUTO: 30 PG (ref 27–31)
MCHC RBC AUTO-ENTMCNC: 33.3 G/DL (ref 32–36)
MCV RBC AUTO: 90 FL (ref 82–98)
NONHDLC SERPL-MCNC: 110 MG/DL
NUCLEATED RBC (/100WBC) (OHS): 0 /100 WBC
PLATELET # BLD AUTO: 247 K/UL (ref 150–450)
PMV BLD AUTO: 11.1 FL (ref 9.2–12.9)
POTASSIUM SERPL-SCNC: 4.3 MMOL/L (ref 3.5–5.1)
PROT SERPL-MCNC: 7.7 GM/DL (ref 6–8.4)
PSA SERPL-MCNC: 0.44 NG/ML
RBC # BLD AUTO: 5.5 M/UL (ref 4.6–6.2)
RELATIVE EOSINOPHIL (OHS): 2 %
RELATIVE LYMPHOCYTE (OHS): 18.9 % (ref 18–48)
RELATIVE MONOCYTE (OHS): 11.6 % (ref 4–15)
RELATIVE NEUTROPHIL (OHS): 66.6 % (ref 38–73)
SODIUM SERPL-SCNC: 139 MMOL/L (ref 136–145)
TRIGL SERPL-MCNC: 107 MG/DL (ref 30–150)
WBC # BLD AUTO: 10.92 K/UL (ref 3.9–12.7)

## 2025-08-19 PROCEDURE — 3079F DIAST BP 80-89 MM HG: CPT | Mod: CPTII,S$GLB,, | Performed by: INTERNAL MEDICINE

## 2025-08-19 PROCEDURE — 36415 COLL VENOUS BLD VENIPUNCTURE: CPT | Mod: PO

## 2025-08-19 PROCEDURE — 3074F SYST BP LT 130 MM HG: CPT | Mod: CPTII,S$GLB,, | Performed by: INTERNAL MEDICINE

## 2025-08-19 PROCEDURE — 99396 PREV VISIT EST AGE 40-64: CPT | Mod: S$GLB,,, | Performed by: INTERNAL MEDICINE

## 2025-08-19 PROCEDURE — 80053 COMPREHEN METABOLIC PANEL: CPT

## 2025-08-19 PROCEDURE — 83036 HEMOGLOBIN GLYCOSYLATED A1C: CPT

## 2025-08-19 PROCEDURE — 85025 COMPLETE CBC W/AUTO DIFF WBC: CPT

## 2025-08-19 PROCEDURE — 84153 ASSAY OF PSA TOTAL: CPT

## 2025-08-19 PROCEDURE — 1159F MED LIST DOCD IN RCRD: CPT | Mod: CPTII,S$GLB,, | Performed by: INTERNAL MEDICINE

## 2025-08-19 PROCEDURE — 99999 PR PBB SHADOW E&M-EST. PATIENT-LVL IV: CPT | Mod: PBBFAC,,, | Performed by: INTERNAL MEDICINE

## 2025-08-19 PROCEDURE — 80061 LIPID PANEL: CPT

## 2025-08-19 PROCEDURE — 3008F BODY MASS INDEX DOCD: CPT | Mod: CPTII,S$GLB,, | Performed by: INTERNAL MEDICINE

## 2025-08-19 PROCEDURE — 1160F RVW MEDS BY RX/DR IN RCRD: CPT | Mod: CPTII,S$GLB,, | Performed by: INTERNAL MEDICINE

## 2025-08-19 RX ORDER — ROSUVASTATIN CALCIUM 10 MG/1
10 TABLET, COATED ORAL NIGHTLY
Qty: 90 TABLET | Refills: 3 | Status: SHIPPED | OUTPATIENT
Start: 2025-08-19 | End: 2026-08-19

## 2025-08-19 RX ORDER — LOSARTAN POTASSIUM AND HYDROCHLOROTHIAZIDE 12.5; 1 MG/1; MG/1
1 TABLET ORAL DAILY
Qty: 90 TABLET | Refills: 3 | Status: SHIPPED | OUTPATIENT
Start: 2025-08-19 | End: 2026-08-19

## (undated) DEVICE — SHEET THYROID W/ISO-BAC

## (undated) DEVICE — SUT VICRYL CTD 2-0 GI 27 SH

## (undated) DEVICE — GLOVE SURGICAL LATEX SZ 7

## (undated) DEVICE — COVER OVERHEAD SURG LT BLUE

## (undated) DEVICE — SYR 10CC LUER LOCK

## (undated) DEVICE — SUT VICRYL PLUS 3-0 SH 18IN

## (undated) DEVICE — SEE MEDLINE ITEM 146292

## (undated) DEVICE — SUT MONOCRYL 4.0 PS2 CP496G

## (undated) DEVICE — APPLICATOR CHLORAPREP ORN 26ML

## (undated) DEVICE — ELECTRODE REM PLYHSV RETURN 9

## (undated) DEVICE — Device

## (undated) DEVICE — SOL IRR SOD CHL .9% POUR

## (undated) DEVICE — SEE MEDLINE ITEM 107746

## (undated) DEVICE — NDL HYPO REG 25G X 1 1/2

## (undated) DEVICE — UNDERGLOVES BIOGEL PI SIZE 7.5

## (undated) DEVICE — TOWEL OR DISP STRL BLUE 4/PK